# Patient Record
Sex: FEMALE | Race: AMERICAN INDIAN OR ALASKA NATIVE
[De-identification: names, ages, dates, MRNs, and addresses within clinical notes are randomized per-mention and may not be internally consistent; named-entity substitution may affect disease eponyms.]

---

## 2017-04-18 ENCOUNTER — HOSPITAL ENCOUNTER (EMERGENCY)
Dept: HOSPITAL 14 - H.ER | Age: 32
Discharge: HOME | End: 2017-04-18
Payer: COMMERCIAL

## 2017-04-18 VITALS
TEMPERATURE: 98.1 F | OXYGEN SATURATION: 100 % | RESPIRATION RATE: 16 BRPM | HEART RATE: 110 BPM | DIASTOLIC BLOOD PRESSURE: 72 MMHG | SYSTOLIC BLOOD PRESSURE: 136 MMHG
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
## 2017-04-18 DIAGNOSIS — S21.009A: Primary | ICD-10-CM

## 2017-04-18 RX ADMIN — SILVER SULFADIAZINE SCH ML: 10 CREAM TOPICAL at 15:47

## 2017-04-18 NOTE — ED PDOC
HPI: Wound Care





- HPI


Chief Complaint (Nursing): Abnormal Skin Integrity


Chief Complaint (Provider): post op poor wound closure


History Per: Patient


Exam Limitations: no limitations


Onset/Duration Of Symptoms: Days


Current Symptoms Are (Timing): Still Present


Quality Of Symptoms: Other


Severity: Mild


Additional History Per: Patient


Additional Complaint(s): 


33 y/o healthy F presenting s/p breast reduction surgery in [place] 2017. 

Patient [place] and [place] PMD  for some left 

sided breast drainage at the site of incision closure, was prescribed 

clindamycin x 7 days, finished course . Patient states incision not 

closing well after sutures dissolved, presenting today to have it checked out. 

Currently denies discharge from incision site, induration, swelling, erythema, 

fevers, chills, n/v/abd pain/diarrhea


Pending wound care clinic appt 2017 at [facility]


 





Past Medical History


Vital Signs: 





 Last Vital Signs











Temp  98.1 F   17 14:08


 


Pulse  110 H  17 14:08


 


Resp  16   17 14:08


 


BP  136/72   17 14:08


 


Pulse Ox  100   17 14:08














- Medical History


PMH: No Chronic Diseases





- Surgical History


Surgical History:  (4)





- Family History


Family History: States: No Known Family Hx





- Home Medications


Home Medications: 


 Ambulatory Orders











 Medication  Instructions  Recorded


 


Metronidazole [Flagyl] 500 mg PO BID #14 tab 10/01/14














- Allergies


Allergies/Adverse Reactions: 


 Allergies











Allergy/AdvReac Type Severity Reaction Status Date / Time


 


No Known Allergies Allergy   Verified 17 14:07














Review of Systems


Constitutional: Negative for: Fever, Chills, Sweats, Weakness, Malaise, Weight 

loss


Eyes: Negative for: Pain


ENT: Negative for: Ear Pain


Cardiovascular: Negative for: Chest Pain


Respiratory: Negative for: Cough, Shortness of Breath


Gastrointestinal: Negative for: Nausea, Vomiting, Abdominal Pain


Genitourinary Female: Negative for: Dysuria


Musculoskeletal: Negative for: Neck Pain, Shoulder Pain


Skin: Negative for: Rash, Lesions, Jaundice, Bruising


Neurological: Negative for: Weakness, Numbness, Incoordination





Physical Exam





- Physical Exam


Appears: Positive for: No Acute Distress


Skin: Positive for: Normal Color, Warm, Dry


Eye Exam: Positive for: EOMI, PERRL


Neck: Positive for: Normal


Cardiovascular/Chest: Positive for: Regular Rate, Rhythm, Other


Respiratory: Positive for: Normal Breath Sounds


Front/Back of Body: 


  __________________________














  __________________________





 1 - left sided poor inscision closure at 7 O clock position, non infected, no 

drainage, underlying fascia visible, slowly healing. No induration, no 

fluctuance palpated, no swelling, no erythema








- ECG


O2 Sat by Pulse Oximetry: 100





- Progress


ED Course And Treament: 


Poor healing surgical incision closure


- s/p clindamycin x 7 days, 


-topical silvedene, simple dressing placed with 4x4


- follow up with wound care 





Re-evaluation Time: 15:38


Condition: Re-examined, Improved





Disposition





- Clinical Impression


Clinical Impression: 


 Incisional breast wound





- Disposition


Disposition: Routine/Home


Disposition Time: 15:39


Condition: GOOD

## 2017-10-03 ENCOUNTER — HOSPITAL ENCOUNTER (EMERGENCY)
Dept: HOSPITAL 14 - H.ER | Age: 32
Discharge: HOME | End: 2017-10-03
Payer: COMMERCIAL

## 2017-10-03 VITALS
RESPIRATION RATE: 16 BRPM | OXYGEN SATURATION: 100 % | DIASTOLIC BLOOD PRESSURE: 90 MMHG | TEMPERATURE: 98.5 F | HEART RATE: 75 BPM | SYSTOLIC BLOOD PRESSURE: 136 MMHG

## 2017-10-03 DIAGNOSIS — H00.019: Primary | ICD-10-CM

## 2017-10-03 NOTE — ED PDOC
HPI: Eye Injury/Pain


Time Seen by Provider: 10/03/17 12:37


Chief Complaint (Nursing): Eye Problem


Chief Complaint (Provider): left eye swelling


History Per: Patient


History/Exam Limitations: no limitations


Onset/Duration Of Symptoms: Hrs


Current Symptoms Are (Timing): Still Present


Injury To Eye?: No


Severity: Moderate


Quality: Burning, "Pain"


Wears Contact Lens?: No


Associated Symptoms: Swelling


Additional History Per: Patient


Additional Complaint(s): 


The patient is a 33 y/o female who presents to the ED for evaluation of left 

eye swelling, which she woke up with this morning. Patient states she noticed a 

burning sensation to her eye yesterday but denies any injury or insect bite to 

her eye. Patient denies wearing contact lenses or a foreign body sensation as 

well. Patient denies fever or chills, no dizziness or headache. Patient denies 

vision loss or vision change.





Past Medical History


Reviewed: Historical Data, Nursing Documentation, Vital Signs


Vital Signs: 


 Last Vital Signs











Temp  98.5 F   10/03/17 12:03


 


Pulse  75   10/03/17 12:03


 


Resp  16   10/03/17 12:03


 


BP  136/90   10/03/17 12:03


 


Pulse Ox  100   10/03/17 12:03














- Medical History


PMH: No Chronic Diseases





- Surgical History


Surgical History:  (4)





- Family History


Family History: States: No Known Family Hx





- Social History


Current smoker - smoking cessation education provided: No


Ex-Smoker (has not smoked in the last 12 months): No


Alcohol: None


Drugs: Denies





- Home Medications


Home Medications: 


 Ambulatory Orders











 Medication  Instructions  Recorded


 


Metronidazole [Flagyl] 500 mg PO BID #14 tab 10/01/14


 


Silver Sulfadiazine 1% 20 gm 1 ea EXT DAILY #1 tube 17





[Silvadene 1% 20 gm]  


 


Clindamycin [Cleocin] 300 mg PO TID #21 cap 10/03/17


 


Tobramycin [Tobrex] 5 ml TOP QID #1 bottle 10/03/17














- Allergies


Allergies/Adverse Reactions: 


 Allergies











Allergy/AdvReac Type Severity Reaction Status Date / Time


 


No Known Allergies Allergy   Verified 10/03/17 12:33














Review of Systems


ROS Statement: Except As Marked, All Systems Reviewed And Found Negative


Constitutional: Negative for: Fever


Eyes: Positive for: Eyelid Inflammation (left).  Negative for: Vision Change, 

Redness


Cardiovascular: Negative for: Chest Pain


Respiratory: Negative for: Cough


Gastrointestinal: Negative for: Nausea, Vomiting


Neurological: Negative for: Headache, Dizziness





Physical Exam





- Reviewed


Nursing Documentation Reviewed: Yes


Vital Signs Reviewed: Yes





- Physical Exam


Appears: Positive for: Non-toxic, No Acute Distress


Head Exam: Positive for: NORMAL INSPECTION


Skin: Positive for: Warm, Dry


Eye Exam: Positive for: EOMI, PERRL, Periorbital swelling (Swelling and slight 

erythema noted to upper and lower eyelid of left eye, stye is noted to medial 

left lower lid with no active drainage, central pointing is noted.).  Negative 

for: Conjunctival injection, Scleral icterus


Cardiovascular/Chest: Positive for: Regular Rate, Rhythm


Respiratory: Positive for: Normal Breath Sounds.  Negative for: Respiratory 

Distress


Neurologic/Psych: Positive for: Alert, Oriented





- Laboratory Results


Urine Pregnancy POC: Negative





- ECG


O2 Sat by Pulse Oximetry: 100 (RA)


Pulse Ox Interpretation: Normal





Medical Decision Making


Medical Decision Making: 


Time: 1240


Impression: Blepharitis and stye





Plan:


-- Motrin 600 mg PO





Patient given prescriptions for tobramycin ophthalmic drops as well as 

clindamycin oral antibiotic. Advised NSAIDs for pain and warm compresses to 

affected area. Patient was referred to ophthalmologist on call and advised to 

follow-up in 1-2 days.





Scribe Attestation:


Documented by Estephanie Iniguez acting as a scribe for THANG Ramires


Provider Attestation:


All medical record entries made by the Scribe were at my direction and 

personally dictated by me. I have reviewed the chart and agree that the record 

accurately reflects my personal performance of the history, physical exam, 

medical decision making, and the department course for this patient. I have 

also personally directed, reviewed, and agree with the discharge instructions 

and disposition.





Disposition





- Clinical Impression


Clinical Impression: 


 Sty, external, Blepharitis of eyelid of left eye








- Patient ED Disposition


Is Patient to be Admitted: No


Counseled Patient/Family Regarding: Diagnosis, Need For Followup, Rx Given





- Disposition


Referrals: 


Cheikh Epstein MD [Staff Provider] - 


Disposition: Routine/Home


Disposition Time: 13:10


Condition: STABLE


Additional Instructions: 


Take prescription medicines directed. Over-the-counter Tylenol or Advil for 

pain as needed. Apply warm compresses with Epsom salts to affected area as 

often as possible.  Follow-up with ophthalmologist in one to 2 days.


Prescriptions: 


Clindamycin [Cleocin] 300 mg PO TID #21 cap


Tobramycin [Tobrex] 5 ml TOP QID #1 bottle


Instructions:  Stye (ED), Blepharitis (ED)


Forms:  [facility] (English), [facility] ED School/Work Excuse

## 2018-05-01 ENCOUNTER — HOSPITAL ENCOUNTER (EMERGENCY)
Dept: HOSPITAL 14 - H.ER | Age: 33
Discharge: HOME | End: 2018-05-01
Payer: COMMERCIAL

## 2018-05-01 VITALS — TEMPERATURE: 97 F | HEART RATE: 87 BPM | DIASTOLIC BLOOD PRESSURE: 81 MMHG | SYSTOLIC BLOOD PRESSURE: 137 MMHG

## 2018-05-01 VITALS — OXYGEN SATURATION: 98 %

## 2018-05-01 VITALS — BODY MASS INDEX: 31.8 KG/M2

## 2018-05-01 DIAGNOSIS — N39.0: Primary | ICD-10-CM

## 2018-05-01 LAB
BACTERIA #/AREA URNS HPF: (no result) /[HPF]
BILIRUB UR-MCNC: NEGATIVE MG/DL
COLOR UR: YELLOW
GLUCOSE UR STRIP-MCNC: (no result) MG/DL
LEUKOCYTE ESTERASE UR-ACNC: (no result) LEU/UL
PH UR STRIP: 7 [PH] (ref 5–8)
PROT UR STRIP-MCNC: NEGATIVE MG/DL
RBC # UR STRIP: NEGATIVE /UL
SP GR UR STRIP: 1.02 (ref 1–1.03)
SQUAMOUS EPITHIAL: 19 /HPF (ref 0–5)
URINE CLARITY: (no result)
UROBILINOGEN UR-MCNC: (no result) MG/DL (ref 0.2–1)

## 2018-05-01 NOTE — ED PDOC
HPI: General Adult


Time Seen by Provider: 18 12:04


Chief Complaint (Nursing): Medical Clearance


Chief Complaint (Provider): Female genitourinary 


History Per: Patient


History/Exam Limitations: no limitations


Onset/Duration Of Symptoms: Days (x4)


Current Symptoms Are (Timing): Still Present


Additional Complaint(s): 


Lexi Yan is a 33 year old female with no past medical history who is 

presenting to the ER for evaluation of genital itching and an STI panel, s/p 

having intercourse with a new partner 8 days ago. Patient states that the 

symptoms started appearing 4 days ago but denies any back pain, fever, nausea, 

vomiting, or abdominal pain. She offers no other medical complaints at this 

time. 





PMD: none provided











Past Medical History


Reviewed: Historical Data, Nursing Documentation, Vital Signs


Vital Signs: 


 Last Vital Signs











Temp  97 F L  18 11:43


 


Pulse  87   18 11:43


 


Resp      


 


BP  137/81   18 11:43


 


Pulse Ox  98   18 13:01














- Medical History


PMH: No Chronic Diseases





- Surgical History


Surgical History:  (4)





- Family History


Family History: States: Unknown Family Hx





- Living Arrangements


Living Arrangements: With Family





- Social History


Current smoker - smoking cessation education provided: No


Alcohol: None


Drugs: Denies





- Home Medications


Home Medications: 


 Ambulatory Orders











 Medication  Instructions  Recorded


 


Clindamycin [Cleocin] 300 mg PO TID #21 cap 10/03/17


 


Tobramycin [Tobrex] 5 ml TOP QID #1 bottle 10/03/17


 


Ciprofloxacin [Cipro] 500 mg PO BID #10 tab 18














- Allergies


Allergies/Adverse Reactions: 


 Allergies











Allergy/AdvReac Type Severity Reaction Status Date / Time


 


No Known Allergies Allergy   Verified 10/03/17 12:33














Review of Systems


ROS Statement: Except As Marked, All Systems Reviewed And Found Negative


Constitutional: Negative for: Fever


Gastrointestinal: Negative for: Nausea, Vomiting, Abdominal Pain


Genitourinary Female: Positive for: Other (genital itching )


Musculoskeletal: Negative for: Back Pain





Physical Exam





- Reviewed


Nursing Documentation Reviewed: Yes


Vital Signs Reviewed: Yes





- Physical Exam


Appears: Positive for: Well, Non-toxic, No Acute Distress


Head Exam: Positive for: ATRAUMATIC


Skin: Positive for: Normal Color


Eye Exam: Positive for: Normal appearance


ENT: Positive for: Normal ENT Inspection


Neck: Positive for: Normal


Cardiovascular/Chest: Positive for: Regular Rate, Rhythm.  Negative for: Murmur


Respiratory: Positive for: Normal Breath Sounds.  Negative for: Respiratory 

Distress


Gastrointestinal/Abdominal: Negative for: Tenderness


Back: Positive for: Normal Inspection


Extremity: Positive for: Normal ROM


Neurologic/Psych: Positive for: Alert, Oriented.  Negative for: Motor/Sensory 

Deficits





- ECG


O2 Sat by Pulse Oximetry: 98 (RA)


Pulse Ox Interpretation: Normal





Medical Decision Making


Medical Decision Making: 


Time: 12:28


Plan:


--ED Urine Pregnancy


--ED Urine Dipstick


--Urine Culture


--Urinalysis


--Chlamydia/GC RNA





--------------------------------------------------------------------------------

---------------------------------------


Scribe Attestation:


Documented by Kori Souza, acting as a scribe for Anastasiia Mcclain PA-C.





Provider Scribe Attestation:


All medical record entries made by the Scribe were at my direction and 

personally dictated by me. I have reviewed the chart and agree that the record 

accurately reflects my personal performance of the history, physical exam, 

medical decision making, and the department course for this patient. I have 

also personally directed, reviewed, and agree with the discharge instructions 

and disposition.











Disposition





- Clinical Impression


Clinical Impression: 


 UTI (urinary tract infection)








- Patient ED Disposition


Is Patient to be Admitted: No


Counseled Patient/Family Regarding: Diagnosis, Need For Followup, Rx Given





- Disposition


Disposition: Routine/Home


Disposition Time: 13:57


Condition: STABLE


Prescriptions: 


Ciprofloxacin [Cipro] 500 mg PO BID #10 tab


Instructions:  General (DC), Urinary Tract Infections in Adults


Forms:  [employer] (English)